# Patient Record
Sex: MALE | Race: WHITE | Employment: OTHER | URBAN - METROPOLITAN AREA
[De-identification: names, ages, dates, MRNs, and addresses within clinical notes are randomized per-mention and may not be internally consistent; named-entity substitution may affect disease eponyms.]

---

## 2017-06-02 ENCOUNTER — DOCUMENTATION ONLY (OUTPATIENT)
Dept: FAMILY MEDICINE CLINIC | Age: 37
End: 2017-06-02

## 2017-06-02 NOTE — PROGRESS NOTES
Pt came in late and physician already stated no late pts. Pt's appt was at 4:05pm and needed forms done because last OV was 10/2015. Pt was mad that he could not be seen. Pt wanted refills for Adderrall and Klonopin, and nother controlled substance. PSR told pt that office would need OV notes from last office before those meds could be refilled. Pt asked what about the med that were being filled before here. PSR told him that because those meds are controlled substances we need OV notes by law to refill them. Pt was mad that no one told him before while on the phone. PSR asked who he talked to because the answering service doesn't know those rules. Pt stated that his wife called and that they should know that if they are helping yall. PSR reiterated to pt that they do not know the rules. He asked for management because he felt like he needed to be seen, PSR told him 4x that she left at 4pm and that he can happily talk to her on Monday morning. Pt was mad that he was not seen and stated that it takes 7-10 days to send records and that [facility] should know that. PSR told him \"Our office takes 14days to send records, so if their's is 7-10 days then that is great. \" Pt was mad and wanted to r/s appt. PSR offered [facility] again for Wed. He said he needed something sooner. PSR offered him Monday at 4:45 with Dr[address] Lupillo Miller 3x but pt was insisting on talking to someone higher than PSR. Pt was reminded that management left at 4pm. Pt stated \"So there is nothing before Wed. \" PSR reiterated that a Monday appt is being offered to him for 4:45pm. Pt wanted a morning appt and offered [place] for 10:30am. Pt was reminded that OV notes from last office is needed before meds could be given.

## 2017-09-19 ENCOUNTER — ED HISTORICAL/CONVERTED ENCOUNTER (OUTPATIENT)
Dept: OTHER | Age: 37
End: 2017-09-19

## 2017-09-20 ENCOUNTER — ED HISTORICAL/CONVERTED ENCOUNTER (OUTPATIENT)
Dept: OTHER | Age: 37
End: 2017-09-20

## 2023-06-30 ENCOUNTER — NURSE TRIAGE (OUTPATIENT)
Dept: OTHER | Facility: CLINIC | Age: 43
End: 2023-06-30